# Patient Record
Sex: MALE | NOT HISPANIC OR LATINO | Employment: STUDENT | URBAN - METROPOLITAN AREA
[De-identification: names, ages, dates, MRNs, and addresses within clinical notes are randomized per-mention and may not be internally consistent; named-entity substitution may affect disease eponyms.]

---
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
## 2024-06-14 ENCOUNTER — HOSPITAL ENCOUNTER (EMERGENCY)
Facility: HOSPITAL | Age: 19
Discharge: HOME | End: 2024-06-14
Attending: EMERGENCY MEDICINE
Payer: MEDICAID

## 2024-06-14 VITALS
RESPIRATION RATE: 24 BRPM | TEMPERATURE: 97.7 F | HEART RATE: 90 BPM | OXYGEN SATURATION: 94 % | SYSTOLIC BLOOD PRESSURE: 140 MMHG | DIASTOLIC BLOOD PRESSURE: 73 MMHG

## 2024-06-14 DIAGNOSIS — T78.3XXA ANGIOEDEMA, INITIAL ENCOUNTER: ICD-10-CM

## 2024-06-14 DIAGNOSIS — T78.2XXA ANAPHYLAXIS, INITIAL ENCOUNTER: Primary | ICD-10-CM

## 2024-06-14 PROCEDURE — 25810000003 SODIUM CHLORIDE 0.9 % SOLUTION: Performed by: EMERGENCY MEDICINE

## 2024-06-14 PROCEDURE — 96374 THER/PROPH/DIAG INJ IV PUSH: CPT

## 2024-06-14 PROCEDURE — 99283 EMERGENCY DEPT VISIT LOW MDM: CPT

## 2024-06-14 RX ORDER — EPINEPHRINE 0.3 MG/.3ML
0.3 INJECTION SUBCUTANEOUS ONCE
Qty: 1 EACH | Refills: 3 | Status: SHIPPED | OUTPATIENT
Start: 2024-06-14 | End: 2024-06-14

## 2024-06-14 RX ORDER — FAMOTIDINE 10 MG/ML
20 INJECTION, SOLUTION INTRAVENOUS ONCE
Status: COMPLETED | OUTPATIENT
Start: 2024-06-14 | End: 2024-06-14

## 2024-06-14 RX ORDER — PREDNISONE 50 MG/1
50 TABLET ORAL DAILY
Qty: 5 TABLET | Refills: 0 | Status: SHIPPED | OUTPATIENT
Start: 2024-06-14

## 2024-06-14 RX ADMIN — SODIUM CHLORIDE 1000 ML: 9 INJECTION, SOLUTION INTRAVENOUS at 16:02

## 2024-06-14 RX ADMIN — FAMOTIDINE 20 MG: 10 INJECTION INTRAVENOUS at 16:02

## 2024-06-14 NOTE — ED PROVIDER NOTES
EMERGENCY DEPARTMENT ENCOUNTER    Room Number:  31/31  PCP: Provider, No Known  Historian: Patient      HPI:  Chief Complaint: Anaphylaxis/allergic reaction  A complete HPI/ROS/PMH/PSH/SH/FH are unobtainable due to: None  Context: Bhavik Cochran is a 19 y.o. male who presents to the ED c/o sudden onset of probable anaphylactoid reaction that began shortly after drinking a smoothie from smoothie [employer].  He reports that he has a known peanut allergy so likely ingested peanuts with the remnants of causing the symptoms.  He states that at the time he did develop some mild shortness of breath but mostly had a rash as well as swelling to the lips and tongue.  He went to the pharmacy and was given Benadryl as well as a shot of an EpiPen.  Upon arrival to the emergency room, he reports almost complete resolution of symptoms.  He denies any other physical complaints.  Symptoms were severe and now are very minimal posttreatment.            PAST MEDICAL HISTORY  Active Ambulatory Problems     Diagnosis Date Noted    No Active Ambulatory Problems     Resolved Ambulatory Problems     Diagnosis Date Noted    No Resolved Ambulatory Problems     No Additional Past Medical History         PAST SURGICAL HISTORY  History reviewed. No pertinent surgical history.      FAMILY HISTORY  History reviewed. No pertinent family history.      SOCIAL HISTORY  Social History     Socioeconomic History    Marital status: Single         ALLERGIES  Drug ingredient [bee pollen] and Drug class, food [peanut-containing drug products]        REVIEW OF SYSTEMS  Review of Systems   Constitutional:  Negative for activity change, appetite change and fever.   HENT:  Positive for facial swelling. Negative for congestion and sore throat.    Eyes: Negative.    Respiratory:  Positive for shortness of breath. Negative for cough.    Cardiovascular:  Negative for chest pain and leg swelling.   Gastrointestinal:  Negative for abdominal pain, diarrhea and vomiting.    Endocrine: Negative.    Genitourinary:  Negative for decreased urine volume and dysuria.   Musculoskeletal:  Negative for neck pain.   Skin:  Positive for rash. Negative for wound.   Allergic/Immunologic: Negative.    Neurological:  Negative for weakness, numbness and headaches.   Hematological: Negative.    Psychiatric/Behavioral: Negative.     All other systems reviewed and are negative.         PHYSICAL EXAM  ED Triage Vitals [06/14/24 1526]   Temp Heart Rate Resp BP SpO2   97.7 °F (36.5 °C) 89 24 135/77 98 %      Temp src Heart Rate Source Patient Position BP Location FiO2 (%)   -- -- -- -- --       Physical Exam  Constitutional:       General: He is not in acute distress.     Appearance: Normal appearance. He is not ill-appearing or toxic-appearing.   HENT:      Head: Normocephalic and atraumatic.   Eyes:      Extraocular Movements: Extraocular movements intact.      Pupils: Pupils are equal, round, and reactive to light.   Cardiovascular:      Rate and Rhythm: Normal rate and regular rhythm.      Heart sounds: No murmur heard.     No friction rub. No gallop.   Pulmonary:      Effort: Pulmonary effort is normal.      Breath sounds: Normal breath sounds.   Abdominal:      General: Abdomen is flat. There is no distension.      Palpations: Abdomen is soft.      Tenderness: There is no abdominal tenderness.   Musculoskeletal:         General: No swelling or tenderness. Normal range of motion.      Cervical back: Normal range of motion and neck supple.   Skin:     General: Skin is warm and dry.   Neurological:      General: No focal deficit present.      Mental Status: He is alert and oriented to person, place, and time.      Sensory: No sensory deficit.      Motor: No weakness.   Psychiatric:         Mood and Affect: Mood normal.         Behavior: Behavior normal.           Vital signs and nursing notes reviewed.          LAB RESULTS  No results found for this or any previous visit (from the past 24  hour(s)).    Ordered the above labs and reviewed the results.        RADIOLOGY  No Radiology Exams Resulted Within Past 24 Hours    Ordered the above noted radiological studies. Reviewed by me in PACS.            PROCEDURES  Procedures            MEDICATIONS GIVEN IN ER  Medications   sodium chloride 0.9 % bolus 1,000 mL (1,000 mL Intravenous New Bag 6/14/24 1602)   famotidine (PEPCID) injection 20 mg (20 mg Intravenous Given 6/14/24 1602)                   MEDICAL DECISION MAKING, PROGRESS, and CONSULTS    All labs have been independently reviewed by me.  All radiology studies have been reviewed by me and I have also reviewed the radiology report.   EKG's independently viewed and interpreted by me.  Discussion below represents my analysis of pertinent findings related to patient's condition, differential diagnosis, treatment plan and final disposition.      Additional sources:  - Discussed/ obtained information from independent historians: History obtained from the patient himself at bedside.    - External (non-ED) record review: There are no previous inpatient or outpatient records currently available for ED review.    - Chronic or social conditions impacting care: Chronic peanut allergy    - Shared decision making: Discharge decision based on shared conversations have between myself as well as the patient at bedside.      Orders placed during this visit:  Orders Placed This Encounter   Procedures    Monitor Blood Pressure    Continuous Pulse Oximetry           Differential diagnosis includes but is not limited to:    Anaphylaxis, allergic reaction, wheezing, or angioedema      Independent interpretation of labs, radiology studies, and discussions with consultants:  ED Course as of 06/14/24 1721   Fri Jun 14, 2024   3314 I saw this gentleman as he came in as anaphylaxis.  This is a gentleman that has a history of peanut allergies.  He had a smoothie [employer] and then very soon after the erendiraie [employer] within 5 to 10  minutes he started getting headache and started get swelling of his lips and tongue felt like his throat was swelling and was feeling very ill very typical of his allergic reactions in the past.  He went to the store and he took 325 mg Benadryl tablets.  The pharmacist at [employer] gave him 1 dose of epi 0.3 mg.  EMS was called.  He started having some improvement of symptoms.  EMS gave him 1 DuoNeb treatment and also 125 of Solu-Medrol.  When I am seeing him now he is doing much better.  Denies any shortness of breath rash or any swelling.  On my physical exam his vital signs are normal his O2 sats 100% on room air heart rate and blood pressure is normal.  Oropharyngeal exam is unremarkable with no tongue swelling he is handling secretions well with normal voice  On respiratory exam he is clear to auscultation there is no stridor or wheezing.  He is got good intact distal pulses and is hemodynamically stable.  I wanted to see this gentleman prior to my partner who is going to come in and be the primary care provider on this patient.  I Hollie give him a liter fluid bolus.  I will give him some Pepcid and will continue to monitor him and my partner will take over when they arrived at 4 PM.  I explained this all to the patient.  I also discussed the case with the paramedic who brought this patient in [MM]   1702 On reevaluation, the patient is resting comfortably with stable vital signs and states that his anaphylaxis/allergic symptoms have all fully resolved.  At this moment, he is 2 hours post epinephrine injection with again resolution of symptoms.  I did encourage him to use Benadryl as directed and as needed should symptoms return.  I will provide him with steroids for the next several days as well as an EpiPen to use as needed for life-threatening anaphylaxis.  He agrees with that plan and all questions answered. [BM]      ED Course User Index  [BM] Brandt Costa MD  [MM] Stone Alvarez MD          DIAGNOSIS  Final diagnoses:   Anaphylaxis, initial encounter   Angioedema, initial encounter         DISPOSITION  DISCHARGE    Patient discharged in stable condition.    Reviewed implications of results, diagnosis, meds, responsibility to follow up, warning signs and symptoms of possible worsening, potential complications and reasons to return to ER.    Patient/Family voiced understanding of above instructions.    Discussed plan for discharge, as there is no emergent indication for admission. Patient referred to primary care provider for BP management due to today's BP. Pt/family is agreeable and understands need for follow up and repeat testing.  Pt is aware that discharge does not mean that nothing is wrong but it indicates no emergency is present that requires admission and they must continue care with follow-up as given below or physician of their choice.     FOLLOW-UP  [facility]  [address] [place] [id number] [phone]  Schedule an appointment as soon as possible for a visit            Medication List        New Prescriptions      EPINEPHrine 0.3 MG/0.3ML solution auto-injector injection  Commonly known as: EPIPEN  Inject 0.3 mL under the skin into the appropriate area as directed 1 (One) Time for 1 dose.     predniSONE 50 MG tablet  Commonly known as: DELTASONE  Take 1 tablet by mouth Daily.               Where to Get Your Medications        You can get these medications from any pharmacy    Bring a paper prescription for each of these medications  EPINEPHrine 0.3 MG/0.3ML solution auto-injector injection  predniSONE 50 MG tablet                   Latest Documented Vital Signs:  As of 17:21 EDT  BP- 140/73 HR- 90 Temp- 97.7 °F (36.5 °C) O2 sat- 94%              --    Please note that portions of this were completed with a voice recognition program.       Note Disclaimer: At [facility], we believe that sharing information builds trust  and better relationships. You are receiving this note because you are receiving care at [facility] or recently visited. It is possible you will see health information before a provider has talked with you about it. This kind of information can be easy to misunderstand. To help you fully understand what it means for your health, we urge you to discuss this note with your provider.             Brandt Costa MD  06/14/24 4140